# Patient Record
Sex: FEMALE | Race: WHITE | Employment: UNEMPLOYED | URBAN - METROPOLITAN AREA
[De-identification: names, ages, dates, MRNs, and addresses within clinical notes are randomized per-mention and may not be internally consistent; named-entity substitution may affect disease eponyms.]

---
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
## 2025-06-27 ENCOUNTER — OFFICE VISIT (OUTPATIENT)
Age: 20
End: 2025-06-27

## 2025-06-27 VITALS
WEIGHT: 106 LBS | TEMPERATURE: 97.7 F | DIASTOLIC BLOOD PRESSURE: 66 MMHG | HEART RATE: 76 BPM | HEIGHT: 64 IN | BODY MASS INDEX: 18.1 KG/M2 | OXYGEN SATURATION: 98 % | SYSTOLIC BLOOD PRESSURE: 111 MMHG | RESPIRATION RATE: 16 BRPM

## 2025-06-27 DIAGNOSIS — R68.89 FLU-LIKE SYMPTOMS: Primary | ICD-10-CM

## 2025-06-27 LAB
INFLUENZA A ANTIGEN, POC: NEGATIVE
INFLUENZA B ANTIGEN, POC: NEGATIVE
Lab: NORMAL
PERFORMING INSTRUMENT: NORMAL
QC PASS/FAIL: NORMAL
SARS-COV-2, POC: NORMAL

## 2025-06-27 NOTE — PATIENT INSTRUCTIONS
Thank you for visiting [facility] today.    Your symptoms seem most likely related to an upper respiratory virus. Try the following for symptom management:     Nasal Congestion:  Try the ipratropium nasal spray as directed  Saline irrigation kits help wash out sinuses 1-2 times a day  Normal saline nasal spray  Cough:  Throat lozenges, hot tea, and honey may help  Vicks VapoRub at night to help with cough and relieve muscles aches and pain  If not prescribed a cough medication, Robitussin DM is an option.    *Please only take cough syrup at nighttime as it causes drowsiness   *Do not drive or operate any machinery while taking this medication  If you have high blood pressure, take Coricidin HBP (or the generic form) instead.  Follow instructions on the box.  Congestion:  For thick mucus, take Mucinex (with Guaifenesin only) to help thin the mucus.  Follow instructions on the box.  You will need to drink plenty of water with this medication.  Sore Throat:  Lozenges, as needed. Cepacol lozenges will help numb the throat  Chloraseptic spray also helps to numb throat pain  Salt water gargles to soothe throat pain  Sinus pain/pressure:  Warm, wet towel on face to help with facial sinus pain/pressure  Headache/Pain Fever/Body Aches:  If you can take NSAIDs, take Ibuprofen 400-800mg every 8 hours as needed  You may also take Tylenol 325-500mg every 6 hours as needed  Miscellanous:  Zyrtec/Xyzal/Allegra/Claritin during the day or Benadryl at night may help with allergies.  You  may also use the decongestant version of these medications.   Simple foods like chicken noodle soup, smoothies, hot tea with lemon and honey may also help  Avoid smoking  Minimize exposure to irritants    A survey will be sent shortly to your phone/email.  Please complete this so we may know how to better serve you in the future.     Please go immediately to the Emergency Department if you develop shortness of breath, chest pain and

## 2025-06-27 NOTE — PROGRESS NOTES
2025   Patient Status: Established patient  Maryjane Leong (: 2005) is a 20 y.o. female, New patient, here for evaluation of the following chief complaint(s):  Pharyngitis (Patient presents for sore throat, fatigue which started a couple days ago. She has been taking Ibuprofen PRN. She was tested for strep (-) and STD testing.)          Assessment & Plan  Flu-like symptoms   Vital signs stable  Pt in no acute distress and afebrile  Pt alert and competent    Patient is previously healthy and immunocompetent, presenting with symptoms suspicious for likely viral upper respiratory infection.  Differential includes acute bronchitis, rhinosinusitis, allergic rhinitis, bacterial pneumonia, or COVID.  Do not suspect underlying cardiopulmonary process.  I considered, but think unlikely, dangerous causes of this patient's symptoms to include ACS, CHF or COPD exacerbations, pneumonia, pneumothorax.  Patient is nontoxic appearing and not in need of emergent medical intervention.    The patient is a good candidate for outpatient therapy based on normal PO intake, reassuring exam with clear lungs on auscultation, normal oxygen saturations and lack of respiratory distress upon discharge.    Discharge decision based on the following:  clinical impression is consistent with outpatient treatment, patient's exam is stable and patient's condition is stable.    -Provided reassurance and reassessment  -Discussed over-the-counter medications for symptomatic relief  -Provided educational material and patient instructions  -Discharged patient with instructions to follow up with PCP  -Advised to go immediately to the ED for worsening or persistent symptoms  -The patient verbalized understanding     Orders:    POCT COVID-19, Antigen    POCT Influenza A/B Antigen      Handout given with care instructions  OTC for symptom management. Increase fluid intake, ensure adequate nutritional intake.  Follow up with PCP as needed.  Go to

## (undated) DEVICE — SUTURE VCRL SZ 5-0 L18IN ABSRB UD L13MM P-3 3/8 CIR PRIM J493G

## (undated) DEVICE — COVER LT HNDL PLAS RIG 1 PER PK

## (undated) DEVICE — 2.0/2.3 COUNTERSINK F CORT.SCREWS,DENTAL: Brand: APTUS

## (undated) DEVICE — NEEDLE HYPO 27GA L1.25IN GRY POLYPR HUB S STL REG BVL STR

## (undated) DEVICE — TWIST DRILL Ø1.9MM X 35MM, L91MM, AO: Brand: APTUS

## (undated) DEVICE — MAGNETIC INSTR DRAPE 20X16: Brand: MEDLINE INDUSTRIES, INC.

## (undated) DEVICE — SUTURE VCRL SZ 4-0 L27IN ABSRB UD L19MM PS-2 3/8 CIR PRIM J426H

## (undated) DEVICE — 4.0MM EGG

## (undated) DEVICE — 1.1 K-WIRE, TROCAR, 100MM, 10/PKG: Brand: APTUS

## (undated) DEVICE — BANDAGE,GAUZE,BULKEE II,4.5"X4.1YD,STRL: Brand: MEDLINE

## (undated) DEVICE — TWIST DRILL Ø2.35MM X 10MM, L66MM, AO: Brand: APTUS

## (undated) DEVICE — HYPODERMIC SAFETY NEEDLE: Brand: MONOJECT

## (undated) DEVICE — BNDG ELAS HK LOOP 4X5YD NS -- MATRIX

## (undated) DEVICE — SYRINGE,EAR/ULCER, 2 OZ, STERILE: Brand: MEDLINE

## (undated) DEVICE — SOLUTION IRRIG 1000ML 0.9% SOD CHL USP POUR PLAS BTL

## (undated) DEVICE — BANDAGE,GAUZE,CONFORMING,2"X75",STRL,LF: Brand: MEDLINE INDUSTRIES, INC.

## (undated) DEVICE — SUTURE VCRL 2-0 L27IN ABSRB UD PS-2 L19MM 1/2 CIR J428H

## (undated) DEVICE — INTENDED FOR TISSUE SEPARATION, AND OTHER PROCEDURES THAT REQUIRE A SHARP SURGICAL BLADE TO PUNCTURE OR CUT.: Brand: BARD-PARKER ® CARBON RIB-BACK BLADES

## (undated) DEVICE — EXTREMITY - SMH: Brand: MEDLINE INDUSTRIES, INC.

## (undated) DEVICE — PADDING CAST W3INXL4YD POLY POR SPUN DACRON SYN VERSATILE

## (undated) DEVICE — SOLUTION SURG PREP 26 CC PURPREP

## (undated) DEVICE — DRESSING IN STRIPPABLE ENVELOPE: Brand: DERMACEA

## (undated) DEVICE — GLOVE ORANGE PI 7 1/2   MSG9075

## (undated) DEVICE — COVER,MAYO STAND,STERILE: Brand: MEDLINE

## (undated) DEVICE — SYR 10ML LUER LOK 1/5ML GRAD --

## (undated) DEVICE — SCREW EXT FIX L14MM FOR DISTRCTN

## (undated) DEVICE — C-ARM: Brand: UNBRANDED

## (undated) DEVICE — BANDAGE,ELASTIC,ESMARK,STERILE,4"X9',LF: Brand: MEDLINE

## (undated) DEVICE — SUTURE VCRL SZ 3-0 L27IN ABSRB UD L19MM PS-2 3/8 CIR PRIM J427H

## (undated) DEVICE — SUT ETHLN 5-0 18IN P3 BLK --